# Patient Record
Sex: MALE | Race: WHITE | URBAN - METROPOLITAN AREA
[De-identification: names, ages, dates, MRNs, and addresses within clinical notes are randomized per-mention and may not be internally consistent; named-entity substitution may affect disease eponyms.]

---

## 2019-05-18 ENCOUNTER — HOSPITAL ENCOUNTER (EMERGENCY)
Age: 24
Discharge: HOME | End: 2019-05-18
Attending: EMERGENCY MEDICINE
Payer: COMMERCIAL

## 2019-05-18 VITALS
OXYGEN SATURATION: 98 % | DIASTOLIC BLOOD PRESSURE: 81 MMHG | HEART RATE: 98 BPM | SYSTOLIC BLOOD PRESSURE: 139 MMHG | TEMPERATURE: 99.8 F | BODY MASS INDEX: 25.2 KG/M2 | HEIGHT: 71 IN | RESPIRATION RATE: 24 BRPM | WEIGHT: 180 LBS

## 2019-05-18 DIAGNOSIS — Z86.59 HISTORY OF SCHIZOPHRENIA: ICD-10-CM

## 2019-05-18 DIAGNOSIS — F41.1 ANXIETY STATE: Primary | ICD-10-CM

## 2019-05-18 PROCEDURE — 6370000000 HC RX 637 (ALT 250 FOR IP): Performed by: EMERGENCY MEDICINE

## 2019-05-18 PROCEDURE — 99282 EMERGENCY DEPT VISIT SF MDM: CPT

## 2019-05-18 RX ORDER — HYDROXYZINE PAMOATE 25 MG/1
25-50 CAPSULE ORAL 3 TIMES DAILY PRN
Qty: 30 CAPSULE | Refills: 0 | Status: SHIPPED | OUTPATIENT
Start: 2019-05-18 | End: 2019-05-18 | Stop reason: ALTCHOICE

## 2019-05-18 RX ORDER — DIAZEPAM 5 MG/1
5 TABLET ORAL ONCE
Status: COMPLETED | OUTPATIENT
Start: 2019-05-18 | End: 2019-05-18

## 2019-05-18 RX ORDER — DIAZEPAM 5 MG/1
5 TABLET ORAL EVERY 8 HOURS PRN
Qty: 10 TABLET | Refills: 0 | Status: SHIPPED | OUTPATIENT
Start: 2019-05-18 | End: 2019-05-28

## 2019-05-18 RX ADMIN — DIAZEPAM 5 MG: 5 TABLET ORAL at 21:09

## 2019-05-18 ASSESSMENT — ENCOUNTER SYMPTOMS
SHORTNESS OF BREATH: 0
ABDOMINAL DISTENTION: 0
ALLERGIC/IMMUNOLOGIC NEGATIVE: 1
COUGH: 0
BACK PAIN: 0
ABDOMINAL PAIN: 0
COLOR CHANGE: 0
CONSTIPATION: 0
WHEEZING: 0
NAUSEA: 0
DIARRHEA: 0
VOMITING: 0
STRIDOR: 0

## 2019-05-19 NOTE — ED PROVIDER NOTES
[facility]        Pt Name: Charu Corbett  MRN: [id number]  [place] 1995  Date of evaluation: 5/18/2019  Provider: Darryle Heaps, PA-C  PCP: No primary care provider on file. This patient was seen and evaluated by the attending physician [address]       Chief Complaint   Patient presents with   [facility] Psychiatric Evaluation     Patient presents to ER with complaints of needing psychiatric evaluation/medications. Patient states that he has been dx with schizophrenia and anxiety, and has not had any medications for approximately 5 months. States that he is hearing 5 voice; no thoughts of self harm. HISTORY OF PRESENT ILLNESS   (Location/Symptom, Timing/Onset, Context/Setting, Quality, Duration, Modifying Factors, Severity)  Note limiting factors. Charu Corbett is a 26 y.o. male with history of anxiety and schizophrenia who presents to the emergency department stating that he stopped taking all of his psychiatric medications, which she cannot recall the name of, approximately 5 months ago due to insurance issues. He is not currently on any medications or seen a psychiatrist.  However, plans on contacting ache psychiatrist on Monday morning at [facility].  Girlfriend is at bedside with the patient. He appears very anxious and states that he has had high anxiety without suicidal or homicidal ideation. He reports occasional visual hallucinations seeing shadows in the corner and has been hearing several voices in his head. Voices are not telling him to harm himself or harm others at this time. Denies any active hallucinations at this time. Denies illicit drug use or alcohol abuse. He refuses to have any workup here in the emergency department or transferred to psychiatric facility. He is simply requesting something for his anxiety.     Nursing Notes were all reviewed and agreed with Medical: None     Non-medical: None   Tobacco Use    Smoking status: Current Every Day Smoker     Types: Cigarettes    Smokeless tobacco: Never Used   Substance and Sexual Activity    Alcohol use: None     Comment: socially    Drug use: None    Sexual activity: None   Lifestyle    Physical activity:     Days per week: None     Minutes per session: None    Stress: None   Relationships    Social connections:     Talks on phone: None     Gets together: None     Attends Spiritism service: None     Active member of club or organization: None     Attends meetings of clubs or organizations: None     Relationship status: None    Intimate partner violence:     Fear of current or ex partner: None     Emotionally abused: None     Physically abused: None     Forced sexual activity: None   Other Topics Concern    None   Social History Narrative    None       SCREENINGS             PHYSICAL EXAM    (up to 7 for level 4, 8 or more for level 5)     ED Triage Vitals [05/18/19 2020]   BP Temp Temp Source Pulse Resp SpO2 Height Weight   139/81 99.8 °F (37.7 °C) Oral 98 24 98 % 5' 11\" (1.803 m) 180 lb (81.6 kg)       Physical Exam   Constitutional: He is oriented to person, place, and time. He appears well-developed and well-nourished. No distress. HENT:   Head: Normocephalic and atraumatic. Right Ear: External ear normal.   Left Ear: External ear normal.   Nose: Nose normal.   Mouth/Throat: Oropharynx is clear and moist.   Eyes: Pupils are equal, round, and reactive to light. Conjunctivae and EOM are normal. Right eye exhibits no discharge. Left eye exhibits no discharge. No scleral icterus. Neck: Normal range of motion. No JVD present. No Brudzinski's sign and no Kernig's sign noted. Cardiovascular: Normal rate and regular rhythm. Exam reveals no gallop and no friction rub. No murmur heard. Pulmonary/Chest: Effort normal and breath sounds normal.   Abdominal: Soft.  Bowel sounds are normal. He exhibits no distension. There is no tenderness. Musculoskeletal: Normal range of motion. Lymphadenopathy:     He has no cervical adenopathy. Neurological: He is alert and oriented to person, place, and time. No cranial nerve deficit (II-XII intact). Skin: Skin is warm and dry. Capillary refill takes less than 2 seconds. No rash noted. He is not diaphoretic. No erythema. No pallor. Psychiatric: He has a normal mood and affect. His behavior is normal.   Nursing note and vitals reviewed. DIAGNOSTIC RESULTS   LABS:    Labs Reviewed - No data to display    All other labs were within normal range or not returned as of this dictation. EKG: All EKG's are interpreted by the Emergency Department Physician who either signs orCo-signs this chart in the absence of a cardiologist.  Please see their note for interpretation of EKG. RADIOLOGY:   Non-plain film images such as CT, Ultrasound and MRI are read by the radiologist. Plain radiographic images are visualized andpreliminarily interpreted by the  ED Provider with the below findings:        Interpretation perthe Radiologist below, if available at the time of this note:    No orders to display     No results found. PROCEDURES   Unless otherwise noted below, none     Procedures    CRITICAL CARE TIME   N/A    CONSULTS:  None      EMERGENCY DEPARTMENT COURSE and DIFFERENTIALDIAGNOSIS/MDM:   Vitals:    Vitals:    05/18/19 2020   BP: 139/81   Pulse: 98   Resp: 24   Temp: 99.8 °F (37.7 °C)   TempSrc: Oral   SpO2: 98%   Weight: 180 lb (81.6 kg)   Height: 5' 11\" (1.803 m)       Patient was given thefollowing medications:  Medications   diazepam (VALIUM) tablet 5 mg (5 mg Oral Given 5/18/19 2109)     This patient presents complaining of anxiety, visual and auditory hallucinations. Does not have any active hallucinations at this time. Hallucinations are consistent with his schizophrenia history. He is requesting something for his anxiety.   He plans on following up with a psychiatrist on Monday morning. He refuses any blood work or further evaluation here. Does not want to be admitted or transferred to psychiatric facility at this time. At this time, we do not feel that a 72 hour hold is necessary, nor does he meet criteria for this at this present time. Patient was given first dose of Valium here, sent home with this medication and short quantity. Was advised to follow up closely with PCP and psychiatry and may return to ED per discharge instructions. Denies any illicit drug use or alcohol abuse. Does not appear intoxicated. My suspicion is low for accidental or intentional overdose, intoxication, SI or HI, acute psychosis, carotid dissection, sinus abscess, acute fracture, acute CVA, ICH, SAH, TIA, meningitis, encephalitis, pseudotumor cerebri, temporal arteritis, sentinel bleed from ruptured aneurysm, hypertensive urgency or emergency, subdural hematoma, epidural hematoma, ACS, PE, myocarditis, pericarditis, endocarditis, acute pulmonary edema, pleural effusion, pericardial effusion, cardiac tamponade, cardiomyopathy, CHF exacerbation, thoracic aortic dissection, esophageal rupture, other life-threatening arrhythmia, hypertensive urgency or emergency, hemothorax, pulmonary contusion, subcutaneous emphysema, flail chest, pneumo mediastinum, rib fracture, pneumonia, pneumothorax or other concerning pathology. We have addressed concerns and expectations. FINAL IMPRESSION      1. Anxiety state    2.  History of schizophrenia          DISPOSITION/PLAN   DISPOSITION Decision To Discharge 05/18/2019 08:46:38 PM      PATIENT REFERREDTO:  [facility] Pre-Services  [phone]        your psychiatrist    Go in 2 days      [facility] Emergency Department  [address] [facility]  [phone]    If symptoms worsen      DISCHARGE MEDICATIONS:  Discharge Medication List as of 5/18/2019  9:26 PM      START taking these medications    Details diazepam (VALIUM) 5 MG tablet Take 1 tablet by mouth every 8 hours as needed for Anxiety for up to 10 days. , Disp-10 tablet, R-0Print             DISCONTINUED MEDICATIONS:  Discharge Medication List as of 5/18/2019  9:26 PM                 (Please note that portions ofthis note were completed with a voice recognition program.  Efforts were made to edit the dictations but occasionally words are mis-transcribed.)    Ray Callahan PA-C (electronically signed)           Ray Callahan PA-C  05/18/19 7250

## 2019-05-19 NOTE — ED PROVIDER NOTES
I personally evaluated and examined the patient in conjunction with the APC and agree with the assessment, treatment plan and disposition of the patient has recorded by the APC. I reviewed pertinent nurse's notes, triage notes, vital signs, past medical history, family and social history, medications, and allergies. Complete review of systems was conducted by the mid-level provider and/or myself. Review of systems is negative except as documented in the history of present illness. Brief HPI: 71-year-old male presents to the emergency department with chief complaint of anxiety. He reports that he is not currently on any psychiatric medications was previously. He has a history of schizophrenia and anxiety and reports high anxiety. Has hallucinations which are baseline for him but denies any suicidal or homicidal ideations. He has a plan to go to an outpatient psychiatric facility that his girlfriend's mother is associated with on Monday. Physical Exam: General: Patient is in no acute distress   Head: Normocephalic, atraumatic, pupils are equal and reactive to light. EOMI. Neck: Neck is supple. No JVD noted. Heart: RRR no murmurs, rubs, or gallops   Lungs: CTA BL   Abdomen: soft, non-tender, non-distended   Extremities: no lower extremity edema. Capillary refill is less than 2 seconds   Skin: no cyanosis or pallor; no rashes noted   Neuro: CN's 2-12 are grossly intact. No focal neurologic deficit appreciated. Psych: Admits to hallucinations at baseline. No suicidal or homicidal ideations. Appears anxious. Patient given Valium. He has a follow-up with psych on Monday and just wants to get through. I do not feel patient requires inpatient psychiatric hospitalization given that this is his baseline. We'll prescribe him Valium with strict return precautions.     Patient instructed to follow up with their primary care doctor in one-two days and return to the emergency department if

## 2019-06-04 ENCOUNTER — HOSPITAL ENCOUNTER (EMERGENCY)
Age: 24
Discharge: HOME | End: 2019-06-04
Payer: COMMERCIAL

## 2019-06-04 VITALS
SYSTOLIC BLOOD PRESSURE: 130 MMHG | HEIGHT: 71 IN | WEIGHT: 168 LBS | TEMPERATURE: 98.7 F | DIASTOLIC BLOOD PRESSURE: 67 MMHG | HEART RATE: 89 BPM | BODY MASS INDEX: 23.52 KG/M2 | OXYGEN SATURATION: 100 % | RESPIRATION RATE: 14 BRPM

## 2019-06-04 DIAGNOSIS — T23.152A SUPERFICIAL BURN OF PALM OF LEFT HAND, INITIAL ENCOUNTER: Primary | ICD-10-CM

## 2019-06-04 PROCEDURE — 99284 EMERGENCY DEPT VISIT MOD MDM: CPT

## 2019-06-04 RX ORDER — NAPROXEN 500 MG/1
500 TABLET ORAL 2 TIMES DAILY WITH MEALS
Qty: 30 TABLET | Refills: 0 | Status: SHIPPED | OUTPATIENT
Start: 2019-06-04 | End: 2021-09-12

## 2019-06-04 RX ORDER — MUPIROCIN CALCIUM 20 MG/G
CREAM TOPICAL
Qty: 15 G | Refills: 0 | Status: SHIPPED | OUTPATIENT
Start: 2019-06-04 | End: 2019-07-04

## 2019-06-04 ASSESSMENT — PAIN DESCRIPTION - PAIN TYPE: TYPE: ACUTE PAIN

## 2019-06-04 ASSESSMENT — ENCOUNTER SYMPTOMS
NAUSEA: 0
VOMITING: 0
COUGH: 0
RESPIRATORY NEGATIVE: 1
SHORTNESS OF BREATH: 0
ABDOMINAL PAIN: 0
COLOR CHANGE: 1

## 2019-06-04 ASSESSMENT — PAIN DESCRIPTION - LOCATION: LOCATION: HAND

## 2019-06-04 ASSESSMENT — PAIN SCALES - GENERAL: PAINLEVEL_OUTOF10: 5

## 2019-06-04 ASSESSMENT — PAIN DESCRIPTION - ORIENTATION: ORIENTATION: LEFT

## 2019-06-04 NOTE — ED PROVIDER NOTES
[facility]        Pt Name: Yandel Ly  MRN: [id number]  [place] 1995  Date of evaluation: 6/4/2019  Provider: BEN Navarro  PCP: No primary care provider on file. This patient was not seen and evaluated by the attending physician but available for consultation as needed. CHIEF COMPLAINT       Chief Complaint   Patient presents with    Burn     Patient reached into the oven with his left hand to grab food this evening. Patient states his hand is still burning. HISTORY OF PRESENT ILLNESS   (Location/Symptom, Timing/Onset, Context/Setting, Quality, Duration, Modifying Factors, Severity)  Note limiting factors. Yandel Ly is a 26 y.o. male with past medical history of anxiety and schizophrenia who presents ED with complaint of a burn. Patient states he burned the palm of his left hand on an oven this evening. It happened a few hours prior to arrival.  Patient is going to grab food and was hot and he burned his hand. States aching pain rated 5/10 to the palm of his left hand. States redness. Denies blistering or skin breakdown. Denies decreased range of motion, decreased strength, fever/chills or rashes/lesions. States his hand feels tingly. Denies numbness. States right-hand dominant. Denies any other injury or trauma throughout. Denies previous injury or trauma to this area in the past.    Nursing Notes were all reviewed and agreed with or any disagreements were addressed  in the HPI. REVIEW OF SYSTEMS    (2-9 systems for level 4, 10 or more for level 5)     Review of Systems   Constitutional: Negative for activity change, appetite change, chills and fever. Respiratory: Negative. Negative for cough and shortness of breath. Cardiovascular: Negative. Negative for chest pain. Gastrointestinal: Negative for abdominal pain, nausea and vomiting.    Genitourinary: Negative for difficulty urinating and dysuria. Musculoskeletal: Positive for myalgias. Negative for arthralgias, neck pain and neck stiffness. Skin: Positive for color change. Negative for pallor, rash and wound. Neurological: Negative for dizziness, weakness, light-headedness and numbness. Positives and Pertinent negatives as per HPI. Except as noted abovein the ROS, all other systems were reviewed and negative. PAST MEDICAL HISTORY     Past Medical History:   Diagnosis Date    Anxiety     Schizophrenia [facility])          SURGICAL HISTORY   History reviewed. No pertinent surgical history. CURRENTMEDICATIONS       Previous Medications    No medications on file         ALLERGIES     Patient has no known allergies. FAMILYHISTORY     History reviewed. No pertinent family history.        SOCIAL HISTORY       Social History     Socioeconomic History    Marital status: Single     Spouse name: None    Number of children: None    Years of education: None    Highest education level: None   Occupational History    None   Social Needs    Financial resource strain: None    Food insecurity:     Worry: None     Inability: None    Transportation needs:     Medical: None     Non-medical: None   Tobacco Use    Smoking status: Current Every Day Smoker     Types: Cigarettes    Smokeless tobacco: Never Used   Substance and Sexual Activity    Alcohol use: None     Comment: socially    Drug use: Yes     Types: Marijuana    Sexual activity: None   Lifestyle    Physical activity:     Days per week: None     Minutes per session: None    Stress: None   Relationships    Social connections:     Talks on phone: None     Gets together: None     Attends Muslim service: None     Active member of club or organization: None     Attends meetings of clubs or organizations: None     Relationship status: None    Intimate partner violence:     Fear of current or ex partner: None     Emotionally abused: None     Physically abused: None Forced sexual activity: None   Other Topics Concern    None   Social History Narrative    None       SCREENINGS             PHYSICAL EXAM    (up to 7 for level 4, 8 or more for level 5)     ED Triage Vitals [06/04/19 0036]   BP Temp Temp Source Pulse Resp SpO2 Height Weight   130/67 98.7 °F (37.1 °C) Oral 89 14 100 % 5' 11\" (1.803 m) 168 lb (76.2 kg)       Physical Exam   Constitutional: He is oriented to person, place, and time. He appears well-developed and well-nourished. HENT:   Head: Normocephalic and atraumatic. Right Ear: External ear normal.   Left Ear: External ear normal.   Eyes: Right eye exhibits no discharge. Left eye exhibits no discharge. Neck: Normal range of motion. Neck supple. Pulmonary/Chest: Effort normal. No stridor. No respiratory distress. Musculoskeletal: Normal range of motion. Upon examination patient has what appears to be superficial first-degree burn noted to the palm of the left hand. Appears to be located over the palm in the palmar aspects of the proximal second-fifth fingers. No circumferential burn. No blistering or skin breakdown. No edema, ecchymosis, erythema or warmth. Compartments soft. Real pulse and capillary refill brisk. Sensation intact to radial ulnar aspects of distal fingertips. Full range of motion and strength to the MCP, DIP, PIP and IP joints. Neurological: He is alert and oriented to person, place, and time. Skin: Skin is warm and dry. He is not diaphoretic. No erythema. No pallor. Psychiatric: He has a normal mood and affect. His behavior is normal.       DIAGNOSTIC RESULTS   LABS:    Labs Reviewed - No data to display    All other labs were within normal range or not returned as of this dictation. EKG: All EKG's are interpreted by the Emergency Department Physician who either signs orCo-signs this chart in the absence of a cardiologist.  Please see their note for interpretation of EKG.       RADIOLOGY:   Non-plain film images such as CT, Ultrasound and MRI are read by the radiologist. Plain radiographic images are visualized andpreliminarily interpreted by the  ED Provider with the below findings:        Interpretation [facility] Radiologist below, if available at the time of this note:    No orders to display     No results found. PROCEDURES   Unless otherwise noted below, none     Procedures    CRITICAL CARE TIME   N/A    CONSULTS:  None      EMERGENCY DEPARTMENT COURSE and DIFFERENTIALDIAGNOSIS/MDM:   Vitals:    Vitals:    06/04/19 0036   BP: 130/67   Pulse: 89   Resp: 14   Temp: 98.7 °F (37.1 °C)   TempSrc: Oral   SpO2: 100%   Weight: 168 lb (76.2 kg)   Height: 5' 11\" (1.803 m)       Patient was given thefollowing medications:  Medications - No data to display    Patient is a 42-year-old male who presents to ED with complaint of a burn. Upon examination patient has superficial burn noted to the palm of the left hand. No deep burn noted. No blistering noted. Full range of motion and strength. Distal neurovascular intact. Right-hand dominant. Given history and physical examination likely some burn. We'll give prescription for anti-inflammatory. Given prescription for topical medication to prevent infection given potential for skin breakdown as burn heals. Instructed on proper wound care. Follow up with PCP. Return ED for any worsening symptoms. Low suspicion for a left abnormality, fluid imbalance, tendon involvement, nerve involvement, vascular compromise, compartment syndrome, circumferential burn, cellulitis, abscess or other emergent etiology at this time. FINAL IMPRESSION      1.  Superficial burn of palm of left hand, initial encounter          DISPOSITION/PLAN   DISPOSITION Decision To Discharge 06/04/2019 01:42:40 AM      PATIENT REFERREDTO:  [facility] Emergency Department  [address]  [facility]  [phone]  Go to   As needed, If symptoms worsen    [facility]) Pre-Services  [phone]          DISCHARGE MEDICATIONS:  New Prescriptions    MUPIROCIN (BACTROBAN) 2 % CREAM    Apply topically 3 times daily.     NAPROXEN (NAPROSYN) 500 MG TABLET    Take 1 tablet by mouth 2 times daily (with meals)       DISCONTINUED MEDICATIONS:  Discontinued Medications    No medications on file              (Please note that portions ofthis note were completed with a voice recognition program.  Efforts were made to edit the dictations but occasionally words are mis-transcribed.)    BEN Jay (electronically signed)          BEN Cervantes  06/04/19 7556

## 2021-09-12 ENCOUNTER — APPOINTMENT (OUTPATIENT)
Dept: GENERAL RADIOLOGY | Age: 26
End: 2021-09-12
Payer: MEDICAID

## 2021-09-12 ENCOUNTER — HOSPITAL ENCOUNTER (EMERGENCY)
Age: 26
Discharge: HOME | End: 2021-09-12
Payer: MEDICAID

## 2021-09-12 VITALS
RESPIRATION RATE: 18 BRPM | SYSTOLIC BLOOD PRESSURE: 139 MMHG | OXYGEN SATURATION: 98 % | HEART RATE: 69 BPM | BODY MASS INDEX: 22.6 KG/M2 | HEIGHT: 70 IN | DIASTOLIC BLOOD PRESSURE: 76 MMHG | TEMPERATURE: 99.2 F | WEIGHT: 157.9 LBS

## 2021-09-12 DIAGNOSIS — S92.352A CLOSED FRACTURE OF BASE OF FIFTH METATARSAL BONE OF LEFT FOOT: Primary | ICD-10-CM

## 2021-09-12 PROCEDURE — 6370000000 HC RX 637 (ALT 250 FOR IP): Performed by: PHYSICIAN ASSISTANT

## 2021-09-12 PROCEDURE — 29515 APPLICATION SHORT LEG SPLINT: CPT

## 2021-09-12 PROCEDURE — 99283 EMERGENCY DEPT VISIT LOW MDM: CPT

## 2021-09-12 PROCEDURE — 73630 X-RAY EXAM OF FOOT: CPT

## 2021-09-12 RX ORDER — NAPROXEN 500 MG/1
500 TABLET ORAL 2 TIMES DAILY PRN
Qty: 20 TABLET | Refills: 0 | Status: SHIPPED | OUTPATIENT
Start: 2021-09-12 | End: 2021-09-22

## 2021-09-12 RX ORDER — HYDROCODONE BITARTRATE AND ACETAMINOPHEN 5; 325 MG/1; MG/1
1 TABLET ORAL EVERY 6 HOURS PRN
Qty: 15 TABLET | Refills: 0 | Status: SHIPPED | OUTPATIENT
Start: 2021-09-12 | End: 2021-09-15

## 2021-09-12 RX ORDER — NAPROXEN 250 MG/1
500 TABLET ORAL ONCE
Status: DISCONTINUED | OUTPATIENT
Start: 2021-09-12 | End: 2021-09-12 | Stop reason: HOSPADM

## 2021-09-12 ASSESSMENT — ENCOUNTER SYMPTOMS
CHEST TIGHTNESS: 0
VOMITING: 0
DIARRHEA: 0
NAUSEA: 0
COLOR CHANGE: 1
ABDOMINAL PAIN: 0
SHORTNESS OF BREATH: 0

## 2021-09-12 ASSESSMENT — PAIN SCALES - GENERAL: PAINLEVEL_OUTOF10: 9

## 2021-09-12 NOTE — ED PROVIDER NOTES
[address] [facility]        Pt Name: Dom Yoon  MRN: [id number]  [place] 1995  Date of evaluation: 9/12/2021  Provider: Adis Theodore PA-C  PCP: No primary care provider on file. Note Started: 11:25 AM EDT       ESTRELLA. I have evaluated this patient. My supervising physician was available for consultation. CHIEF COMPLAINT       Chief Complaint   Patient presents with    Foot Injury     Pt states that he stepped onto something incorrectly and injured his left foot approx one hour ago. Swelling noted. HISTORY OF PRESENT ILLNESS   (Location, Timing/Onset, Context/Setting, Quality, Duration, Modifying Factors, Severity, Associated Signs and Symptoms)  Note limiting factors. Chief Complaint: Left foot injury    Dom Yoon is a 27 y.o. male who presents presents the emergency department after an injury accident that occurred within the hour prior to arrival.  Patient states that he missed stepped and had acute onset of pain and discomfort of the lateral border of his foot. He states he has had emerging ecchymosis as well as swelling and is unable to ambulate because of the level of pain. Current level of pain is 9 out of 10. He denies numbness and or tingling. He did not fall hit his head blackout or lose consciousness this is an isolated complaint to his left foot without ankle pain or any additional musculoskeletal complaints. He is done nothing yet for the above-mentioned secondary the acuity injury presents the ED for evaluation and treatment. Nursing Notes were all reviewed and agreed with or any disagreements were addressed in the HPI. REVIEW OF SYSTEMS    (2-9 systems for level 4, 10 or more for level 5)     Review of Systems   Constitutional: Negative for activity change, chills and fever. Respiratory: Negative for chest tightness and shortness of breath. Cardiovascular: Negative for chest pain. Gastrointestinal: Negative for abdominal pain, diarrhea, nausea and vomiting. Genitourinary: Negative for dysuria and flank pain. Musculoskeletal: Positive for arthralgias and gait problem. Skin: Positive for color change. Negative for wound. All other systems reviewed and are negative. Positives and Pertinent negatives as per HPI. Except as noted above in the ROS, all other systems were reviewed and negative. PAST MEDICAL HISTORY     Past Medical History:   Diagnosis Date    Anxiety     Schizophrenia [facility])          SURGICAL HISTORY   History reviewed. No pertinent surgical history. CURRENTMEDICATIONS       Previous Medications    No medications on file         ALLERGIES     Patient has no known allergies. FAMILYHISTORY     History reviewed. No pertinent family history. SOCIAL HISTORY       Social History     Tobacco Use    Smoking status: Current Every Day Smoker     Packs/day: 0.50     Types: Cigarettes    Smokeless tobacco: Never Used   Substance Use Topics    Alcohol use: Not on file     Comment: no    Drug use: Yes     Types: Marijuana       SCREENINGS             PHYSICAL EXAM    (up to 7 for level 4, 8 or more for level 5)     ED Triage Vitals [09/12/21 1116]   BP Temp Temp Source Pulse Resp SpO2 Height Weight   139/76 99.2 °F (37.3 °C) Oral 69 18 98 % 5' 10\" (1.778 m) 157 lb 14.4 oz (71.6 kg)       Physical Exam  Vitals and nursing note reviewed. Constitutional:       General: He is awake. He is not in acute distress. Appearance: Normal appearance. He is well-developed. He is not diaphoretic. Comments: Noted to be mildly uncooperative. Patient did not tolerate the physical examination well and on multiple occasions was reaching and batting away the examiner's hand. It was explained to him this is imperative and once he understood that need for examination he was cooperative. HENT:      Head: Normocephalic and atraumatic.  No raccoon eyes, Owen's sign or laceration. Right Ear: External ear normal.      Left Ear: External ear normal.   Eyes:      General: No scleral icterus. Right eye: No discharge. Left eye: No discharge. Conjunctiva/sclera: Conjunctivae normal.   Neck:      Vascular: No JVD. Cardiovascular:      Rate and Rhythm: Normal rate and regular rhythm. Heart sounds: No murmur heard. No friction rub. No gallop. Pulmonary:      Effort: Pulmonary effort is normal. No accessory muscle usage or respiratory distress. Breath sounds: Normal breath sounds. No wheezing, rhonchi or rales. Musculoskeletal:      Cervical back: Normal range of motion. Left ankle: Normal.      Left foot: Decreased range of motion. Swelling, tenderness and bony tenderness present. Comments: Tenderness at the base of the fifth as well as over the fourth metatarsal.  There is evidence of ecchymosis and swelling. No tenderness noted over the ankle and of itself. Able to move toes. Capillary refill is brisk and is neurovascular intact. Skin:     General: Skin is warm and dry. Neurological:      Mental Status: He is alert and oriented to person, place, and time. GCS: GCS eye subscore is 4. GCS verbal subscore is 5. GCS motor subscore is 6. Cranial Nerves: No cranial nerve deficit. Sensory: No sensory deficit. Coordination: Coordination normal.   Psychiatric:         Behavior: Behavior normal. Behavior is cooperative. DIAGNOSTIC RESULTS   LABS:    Labs Reviewed - No data to display    When ordered only abnormal lab results are displayed. All other labs were within normal range or not returned as of this dictation. EKG: When ordered, EKG's are interpreted by the Emergency Department Physician in the absence of a cardiologist.  Please see their note for interpretation of EKG.     RADIOLOGY:   Non-plain film images such as CT, Ultrasound and MRI are read by the radiologist. Plain radiographic images are visualized and preliminarily interpreted by the ED Provider with the below findings:        Interpretation per the Radiologist below, if available at the time of this note:    XR FOOT LEFT (MIN 3 VIEWS)   Final Result   Nondisplaced transverse fracture proximal 5th metatarsal               PROCEDURES   Unless otherwise noted below, none     Ortho Injury    Date/Time: 9/12/2021 12:09 PM  Performed by: Bonnie Vernon PA-C  Authorized by: Bonnie Vernon PA-C   Consent: Verbal consent obtained. Injury location: foot  Location details: left foot  Injury type: fracture  Fracture type: fifth metatarsal  Pre-procedure neurovascular assessment: neurovascularly intact  Pre-procedure distal perfusion: normal  Pre-procedure neurological function: normal  Pre-procedure range of motion: reduced  Manipulation performed: no  Immobilization: splint  Splint type: short leg  Supplies used: Ortho-Glass  Post-procedure neurovascular assessment: post-procedure neurovascularly intact  Post-procedure distal perfusion: normal  Post-procedure neurological function: normal  Post-procedure range of motion: unchanged          CRITICAL CARE TIME   N/A    CONSULTS:  None      EMERGENCY DEPARTMENT COURSE and DIFFERENTIAL DIAGNOSIS/MDM:   Vitals:    Vitals:    09/12/21 1116   BP: 139/76   Pulse: 69   Resp: 18   Temp: 99.2 °F (37.3 °C)   TempSrc: Oral   SpO2: 98%   Weight: 157 lb 14.4 oz (71.6 kg)   Height: 5' 10\" (1.778 m)       Patient was given the following medications:  Medications   naproxen (NAPROSYN) tablet 500 mg (500 mg Oral Not Given 9/12/21 1126)           The patient's detailed history of present illness is documented as above. Upon arrival to the emergency department the patient's vital signs are as documented. The patient is noted to be hemodynamically stable and afebrile. Physical examination findings are as above.   Patient was initially offered Naprosyn here in the emergency department but he says he does not like medications and when offered this by the nurse refused to despite not informing me of any the above mention even though that was the care plan. Radiographs of the patient's left foot do demonstrate evidence of a Mann fracture. I discussed with him this is notorious fracture for having difficulties with healing. He will be made nonweightbearing. Be placed in a splint. Follow-up with orthopedics. Strict potential instructions for return. Medications for the home environment where he can choose to take or not take. The patient has been made aware of the signs and symptoms which would necessitate an immediate return to the emergency department and verbalizes an understanding of these signs and symptoms. FINAL IMPRESSION      1. Closed fracture of base of fifth metatarsal bone of left foot          DISPOSITION/PLAN   DISPOSITION Decision To Discharge 09/12/2021 12:07:22 PM      PATIENT REFERRED TO:  Valerie Ribera, [address] [address] [address]  [address] [place] [address]  [phone]    Schedule an appointment as soon as possible for a visit       [facility] Emergency Department  [address] [facility]  [phone]    If symptoms worsen      DISCHARGE MEDICATIONS:  New Prescriptions    HYDROCODONE-ACETAMINOPHEN (NORCO) 5-325 MG PER TABLET    Take 1 tablet by mouth every 6 hours as needed for Pain for up to 3 days.     NAPROXEN (NAPROSYN) 500 MG TABLET    Take 1 tablet by mouth 2 times daily as needed for Pain       DISCONTINUED MEDICATIONS:  Discontinued Medications    NAPROXEN (NAPROSYN) 500 MG TABLET    Take 1 tablet by mouth 2 times daily (with meals)              (Please note that portions of this note were completed with a voice recognition program.  Efforts were made to edit the dictations but occasionally words are mis-transcribed.)    Parvez Crawford PA-C (electronically signed)           Ronal Stephen PA-C  09/12/21 4015

## 2021-09-12 NOTE — ED NOTES
Discharge instructions and prescriptions reviewed with pt. Pt allowed opportunity to ask questions and verbalized understanding.         Jeannie Solis RN  09/12/21 3558

## 2021-09-14 ENCOUNTER — TELEPHONE (OUTPATIENT)
Dept: ORTHOPEDIC SURGERY | Age: 26
End: 2021-09-14

## 2021-09-14 NOTE — TELEPHONE ENCOUNTER
Appointment Request     Patient requesting earlier appointment: Yes  Appointment offered to patient: N/A  Patient Contact Number: [phone]      Patient is calling and would like to be worked in this week for foot fracture.

## 2021-09-15 NOTE — TELEPHONE ENCOUNTER
I left a message for the patient to call back to schedule and appointment. Dr. Jammie Nielson can see him tomorrow 9/16/21 at 8 am with a 7:30 am arrival time. If he cannot make that, please schedule him for the next available day/time.

## 2021-09-24 ENCOUNTER — OFFICE VISIT (OUTPATIENT)
Dept: ORTHOPEDIC SURGERY | Age: 26
End: 2021-09-24
Payer: MEDICAID

## 2021-09-24 VITALS — BODY MASS INDEX: 22.48 KG/M2 | HEIGHT: 70 IN | WEIGHT: 157 LBS

## 2021-09-24 DIAGNOSIS — S92.355A NONDISPLACED FRACTURE OF FIFTH METATARSAL BONE, LEFT FOOT, INITIAL ENCOUNTER FOR CLOSED FRACTURE: Primary | ICD-10-CM

## 2021-09-24 PROCEDURE — G8427 DOCREV CUR MEDS BY ELIG CLIN: HCPCS | Performed by: ORTHOPAEDIC SURGERY

## 2021-09-24 PROCEDURE — G8420 CALC BMI NORM PARAMETERS: HCPCS | Performed by: ORTHOPAEDIC SURGERY

## 2021-09-24 PROCEDURE — 28470 CLTX METATARSAL FX WO MNP EA: CPT | Performed by: ORTHOPAEDIC SURGERY

## 2021-09-24 PROCEDURE — 99202 OFFICE O/P NEW SF 15 MIN: CPT | Performed by: ORTHOPAEDIC SURGERY

## 2021-09-24 NOTE — PROGRESS NOTES
Teodora Rose  [id number]  September 24, 2021    Chief Complaint   Patient presents with    Foot Pain     left       History: The patient is a 28-year-old gentleman who is here for evaluation of his left foot. The patient reportedly was walking in his home and he stepped over a laundry basket and he twisted his left foot. He immediately noted left foot pain and swelling. He ultimately presented to the emergency room and x-rays were obtained. He was then placed in a splint. The injury occurred on 9/12/2021. [place] foot pain and swelling. The patient's  past medical history, medications, allergies,  family history, social history, and have been reviewed, and dated and are recorded in the chart. Pertinent items are noted in HPI. Review of systems reviewed from Pertinent History Form dated on 9/24/2021 and available in the patient's chart under the Media tab. Vitals:  Ht 5' 10\" (1.778 m)   Wt 157 lb (71.2 kg)   BMI 22.53 kg/m²     Physical: On examination today, the patient is alert and oriented x3. Examination of the left lower extremity reveals severe tenderness to palpation about the base of the fifth metatarsal.  He has very mild tenderness palpation over the anterior talofibular ligament. He is nontender over the distal fibula and the medial malleolus. He is able to lightly dorsiflex and plantarflex the left ankle without difficulty. He is nontender about the midfoot or of the hindfoot. Examination of the skin reveals no lesions or ulcerations. He is neurovascularly intact distally. X-rays: 3 views of the left foot obtained in the emergency room were extensively reviewed. The x-rays confirm a nondisplaced transverse fifth metatarsal base fracture. Impression: Left fifth metatarsal base fracture    Plan: The xray findings were reviewed with the patient and we will immobilize the ankle with a tall boot.   He was instructed on the following: Natural history and expected course discussed. Questions answered. Rest, ice, compression, and elevation (RICE) therapy. . Follow up will be  in 4 week(s). The patient was encouraged to bear weight minimally on the left lower extremity. He will follow-up as directed. At follow-up, 3 views of the left foot will be obtained. We will then consider gradually discontinuing the boot.

## 2021-12-23 ENCOUNTER — HOSPITAL ENCOUNTER (EMERGENCY)
Age: 26
Discharge: HOME | End: 2021-12-23
Payer: MEDICAID

## 2021-12-23 VITALS
OXYGEN SATURATION: 98 % | TEMPERATURE: 100.1 F | BODY MASS INDEX: 23.74 KG/M2 | HEART RATE: 89 BPM | WEIGHT: 165.8 LBS | SYSTOLIC BLOOD PRESSURE: 115 MMHG | RESPIRATION RATE: 16 BRPM | HEIGHT: 70 IN | DIASTOLIC BLOOD PRESSURE: 65 MMHG

## 2021-12-23 DIAGNOSIS — Z20.822 PERSON UNDER INVESTIGATION FOR COVID-19: Primary | ICD-10-CM

## 2021-12-23 LAB
RAPID INFLUENZA  B AGN: NEGATIVE
RAPID INFLUENZA A AGN: NEGATIVE

## 2021-12-23 PROCEDURE — U0005 INFEC AGEN DETEC AMPLI PROBE: HCPCS

## 2021-12-23 PROCEDURE — U0003 INFECTIOUS AGENT DETECTION BY NUCLEIC ACID (DNA OR RNA); SEVERE ACUTE RESPIRATORY SYNDROME CORONAVIRUS 2 (SARS-COV-2) (CORONAVIRUS DISEASE [COVID-19]), AMPLIFIED PROBE TECHNIQUE, MAKING USE OF HIGH THROUGHPUT TECHNOLOGIES AS DESCRIBED BY CMS-2020-01-R: HCPCS

## 2021-12-23 PROCEDURE — 99283 EMERGENCY DEPT VISIT LOW MDM: CPT

## 2021-12-23 PROCEDURE — 87804 INFLUENZA ASSAY W/OPTIC: CPT

## 2021-12-23 PROCEDURE — 6370000000 HC RX 637 (ALT 250 FOR IP): Performed by: PHYSICIAN ASSISTANT

## 2021-12-23 RX ORDER — IBUPROFEN 400 MG/1
400 TABLET ORAL ONCE
Status: COMPLETED | OUTPATIENT
Start: 2021-12-23 | End: 2021-12-23

## 2021-12-23 RX ORDER — ACETAMINOPHEN 500 MG
1000 TABLET ORAL ONCE
Status: COMPLETED | OUTPATIENT
Start: 2021-12-23 | End: 2021-12-23

## 2021-12-23 RX ADMIN — ACETAMINOPHEN 1000 MG: 500 TABLET ORAL at 21:16

## 2021-12-23 RX ADMIN — IBUPROFEN 400 MG: 400 TABLET ORAL at 21:16

## 2021-12-23 ASSESSMENT — ENCOUNTER SYMPTOMS
ABDOMINAL PAIN: 0
VOICE CHANGE: 0
TROUBLE SWALLOWING: 0
SORE THROAT: 1
COUGH: 1
NAUSEA: 0
DIARRHEA: 1
RHINORRHEA: 0
SHORTNESS OF BREATH: 0
VOMITING: 0

## 2021-12-23 ASSESSMENT — PAIN SCALES - GENERAL
PAINLEVEL_OUTOF10: 7
PAINLEVEL_OUTOF10: 7

## 2021-12-23 ASSESSMENT — PAIN DESCRIPTION - LOCATION: LOCATION: GENERALIZED

## 2021-12-24 LAB — SARS-COV-2: DETECTED

## 2021-12-24 NOTE — ED TRIAGE NOTES
Pt states here for COVID test, concern for COVID. CC muscle and bone pain, also c/o diarrhea, headaches, cough, sore throat, mild shortness of breath. Denies COVID vaccination.

## 2021-12-24 NOTE — ED PROVIDER NOTES
[facility]        Pt Name: Rupal Keita  MRN: [id number]  [place] 1995  Date of evaluation: 12/23/2021  Provider: Etienne Rush PA-C  PCP: No primary care provider on file. Note Started: 11:25 PM EST       ESTRELLA. I have evaluated this patient. My supervising physician was available for consultation. CHIEF COMPLAINT       Chief Complaint   Patient presents with    Concern For COVID-19     c/o dizziness, muscle and bone aches, mild respiratory discomfort. States concern for COVID. HISTORY OF PRESENT ILLNESS   (Location, Timing/Onset, Context/Setting, Quality, Duration, Modifying Factors, Severity, Associated Signs and Symptoms)  Note limiting factors. Chief Complaint: Concern for COVID-19    Rupal Keita is a 27 y.o. male who presents to the emergency department today for evaluation for concern of COVID-19. The patient states that he has been experiencing generalized body aches, cough, congestion, sore throat, as well as diarrhea. The patient states that this is actually been ongoing for the past 2 days. He states that his cough is dry, there is no sputum production or hemoptysis. He has no chest pain. He has no shortness of breath. The patient denies any abdominal pain, nausea or vomiting, but states that he has had some looser stool. The patient denies any urinary symptoms. He states that this line at home has similar symptoms. Patient states he had Covid 1 year ago and he is otherwise not vaccinated. The patient states that he did have Covid 1 year ago no symptoms today feel similar to when he had Covid previously. Patient otherwise has no complaints at this    Nursing Notes were all reviewed and agreed with or any disagreements were addressed in the HPI.     REVIEW OF SYSTEMS    (2-9 systems for level 4, 10 or more for level 5)     Review of Systems   Constitutional: Negative for activity change, appetite change, chills and fever. HENT: Positive for congestion and sore throat. Negative for rhinorrhea, trouble swallowing and voice change. Respiratory: Positive for cough. Negative for shortness of breath. Cardiovascular: Negative for chest pain. Gastrointestinal: Positive for diarrhea. Negative for abdominal pain, nausea and vomiting. Genitourinary: Negative for difficulty urinating, dysuria and hematuria. Musculoskeletal: Positive for myalgias. Positives and Pertinent negatives as per HPI. Except as noted above in the ROS, all other systems were reviewed and negative. PAST MEDICAL HISTORY     Past Medical History:   Diagnosis Date    Anxiety     Schizophrenia [facility])          SURGICAL HISTORY   History reviewed. No pertinent surgical history. [address]       Discharge Medication List as of 12/23/2021 10:11 PM      CONTINUE these medications which have NOT CHANGED    Details   naproxen (NAPROSYN) 500 MG tablet Take 1 tablet by mouth 2 times daily as needed for Pain, Disp-20 tablet, R-0Print               ALLERGIES     Patient has no known allergies. FAMILYHISTORY     History reviewed. No pertinent family history. SOCIAL HISTORY       Social History     Tobacco Use    Smoking status: Current Every Day Smoker     Packs/day: 0.50     Types: Cigarettes    Smokeless tobacco: Never Used   Substance Use Topics    Alcohol use: Not on file     Comment: no    Drug use: Yes     Types: Marijuana (Weed)       SCREENINGS             PHYSICAL EXAM    (up to 7 for level 4, 8 or more for level 5)     ED Triage Vitals [12/23/21 2041]   BP Temp Temp Source Pulse Resp SpO2 Height Weight   115/65 100.1 °F (37.8 °C) Oral 89 16 98 % 5' 10\" (1.778 m) 165 lb 12.8 oz (75.2 kg)       Physical Exam  Vitals and nursing note reviewed. Constitutional:       Appearance: He is well-developed. He is not diaphoretic. HENT:      Head: Normocephalic and atraumatic.       Right Ear: External ear completed with a voice recognition program.  Efforts were made to edit the dictations but occasionally words are mis-transcribed.)    Carmen Mauro PA-C (electronically signed)            Carmen Mauro PA-C  12/23/21 4020